# Patient Record
Sex: MALE | Race: WHITE | Employment: FULL TIME | URBAN - METROPOLITAN AREA
[De-identification: names, ages, dates, MRNs, and addresses within clinical notes are randomized per-mention and may not be internally consistent; named-entity substitution may affect disease eponyms.]

---

## 2020-01-27 ENCOUNTER — APPOINTMENT (OUTPATIENT)
Dept: CT IMAGING | Age: 35
End: 2020-01-27
Attending: EMERGENCY MEDICINE
Payer: COMMERCIAL

## 2020-01-27 ENCOUNTER — APPOINTMENT (OUTPATIENT)
Dept: ULTRASOUND IMAGING | Age: 35
End: 2020-01-27
Attending: EMERGENCY MEDICINE
Payer: COMMERCIAL

## 2020-01-27 ENCOUNTER — HOSPITAL ENCOUNTER (EMERGENCY)
Age: 35
Discharge: HOME | End: 2020-01-27
Attending: EMERGENCY MEDICINE
Payer: COMMERCIAL

## 2020-01-27 VITALS
DIASTOLIC BLOOD PRESSURE: 81 MMHG | TEMPERATURE: 98 F | RESPIRATION RATE: 16 BRPM | OXYGEN SATURATION: 99 % | HEART RATE: 85 BPM | SYSTOLIC BLOOD PRESSURE: 125 MMHG

## 2020-01-27 DIAGNOSIS — R11.2 NAUSEA AND VOMITING IN ADULT: ICD-10-CM

## 2020-01-27 DIAGNOSIS — R10.11 RUQ ABDOMINAL PAIN: ICD-10-CM

## 2020-01-27 DIAGNOSIS — K76.0 HEPATIC STEATOSIS: Primary | ICD-10-CM

## 2020-01-27 LAB
ALBUMIN SERPL-MCNC: 4.1 G/DL (ref 3.5–5)
ALBUMIN/GLOB SERPL: 1.1 {RATIO} (ref 1.1–2.2)
ALP SERPL-CCNC: 130 U/L (ref 45–117)
ALT SERPL-CCNC: 110 U/L (ref 12–78)
ANION GAP SERPL CALC-SCNC: 13 MMOL/L (ref 5–15)
APPEARANCE UR: CLEAR
AST SERPL-CCNC: 49 U/L (ref 15–37)
BASOPHILS # BLD: 0 K/UL (ref 0–0.1)
BASOPHILS NFR BLD: 1 % (ref 0–1)
BILIRUB SERPL-MCNC: 0.5 MG/DL (ref 0.2–1)
BILIRUB UR QL: NEGATIVE
BUN SERPL-MCNC: 11 MG/DL (ref 6–20)
BUN/CREAT SERPL: 14 (ref 12–20)
CALCIUM SERPL-MCNC: 9.3 MG/DL (ref 8.5–10.1)
CHLORIDE SERPL-SCNC: 103 MMOL/L (ref 97–108)
CO2 SERPL-SCNC: 24 MMOL/L (ref 21–32)
COLOR UR: ABNORMAL
COMMENT, HOLDF: NORMAL
CREAT SERPL-MCNC: 0.81 MG/DL (ref 0.7–1.3)
DIFFERENTIAL METHOD BLD: NORMAL
EOSINOPHIL # BLD: 0.4 K/UL (ref 0–0.4)
EOSINOPHIL NFR BLD: 5 % (ref 0–7)
ERYTHROCYTE [DISTWIDTH] IN BLOOD BY AUTOMATED COUNT: 12 % (ref 11.5–14.5)
GLOBULIN SER CALC-MCNC: 3.7 G/DL (ref 2–4)
GLUCOSE SERPL-MCNC: 132 MG/DL (ref 65–100)
GLUCOSE UR STRIP.AUTO-MCNC: 100 MG/DL
HCT VFR BLD AUTO: 40.8 % (ref 36.6–50.3)
HGB BLD-MCNC: 13.7 G/DL (ref 12.1–17)
HGB UR QL STRIP: NEGATIVE
IMM GRANULOCYTES # BLD AUTO: 0 K/UL (ref 0–0.04)
IMM GRANULOCYTES NFR BLD AUTO: 0 % (ref 0–0.5)
KETONES UR QL STRIP.AUTO: NEGATIVE MG/DL
LEUKOCYTE ESTERASE UR QL STRIP.AUTO: NEGATIVE
LIPASE SERPL-CCNC: 218 U/L (ref 73–393)
LYMPHOCYTES # BLD: 2.6 K/UL (ref 0.8–3.5)
LYMPHOCYTES NFR BLD: 32 % (ref 12–49)
MCH RBC QN AUTO: 28.8 PG (ref 26–34)
MCHC RBC AUTO-ENTMCNC: 33.6 G/DL (ref 30–36.5)
MCV RBC AUTO: 85.9 FL (ref 80–99)
MONOCYTES # BLD: 0.5 K/UL (ref 0–1)
MONOCYTES NFR BLD: 7 % (ref 5–13)
NEUTS SEG # BLD: 4.5 K/UL (ref 1.8–8)
NEUTS SEG NFR BLD: 55 % (ref 32–75)
NITRITE UR QL STRIP.AUTO: NEGATIVE
NRBC # BLD: 0 K/UL (ref 0–0.01)
NRBC BLD-RTO: 0 PER 100 WBC
PH UR STRIP: 5 [PH] (ref 5–8)
PLATELET # BLD AUTO: 240 K/UL (ref 150–400)
PMV BLD AUTO: 10.6 FL (ref 8.9–12.9)
POTASSIUM SERPL-SCNC: 3.7 MMOL/L (ref 3.5–5.1)
PROT SERPL-MCNC: 7.8 G/DL (ref 6.4–8.2)
PROT UR STRIP-MCNC: NEGATIVE MG/DL
RBC # BLD AUTO: 4.75 M/UL (ref 4.1–5.7)
SAMPLES BEING HELD,HOLD: NORMAL
SODIUM SERPL-SCNC: 140 MMOL/L (ref 136–145)
SP GR UR REFRACTOMETRY: 1.02 (ref 1–1.03)
UROBILINOGEN UR QL STRIP.AUTO: 0.2 EU/DL (ref 0.2–1)
WBC # BLD AUTO: 8 K/UL (ref 4.1–11.1)

## 2020-01-27 PROCEDURE — 74177 CT ABD & PELVIS W/CONTRAST: CPT

## 2020-01-27 PROCEDURE — 36415 COLL VENOUS BLD VENIPUNCTURE: CPT

## 2020-01-27 PROCEDURE — 76705 ECHO EXAM OF ABDOMEN: CPT

## 2020-01-27 PROCEDURE — 99283 EMERGENCY DEPT VISIT LOW MDM: CPT

## 2020-01-27 PROCEDURE — 74011636320 HC RX REV CODE- 636/320: Performed by: EMERGENCY MEDICINE

## 2020-01-27 PROCEDURE — 81003 URINALYSIS AUTO W/O SCOPE: CPT

## 2020-01-27 PROCEDURE — 80053 COMPREHEN METABOLIC PANEL: CPT

## 2020-01-27 PROCEDURE — 96374 THER/PROPH/DIAG INJ IV PUSH: CPT

## 2020-01-27 PROCEDURE — 85025 COMPLETE CBC W/AUTO DIFF WBC: CPT

## 2020-01-27 PROCEDURE — 83690 ASSAY OF LIPASE: CPT

## 2020-01-27 PROCEDURE — 74011250636 HC RX REV CODE- 250/636: Performed by: EMERGENCY MEDICINE

## 2020-01-27 PROCEDURE — 96375 TX/PRO/DX INJ NEW DRUG ADDON: CPT

## 2020-01-27 RX ORDER — METFORMIN HYDROCHLORIDE 1000 MG/1
1000 TABLET ORAL 2 TIMES DAILY
COMMUNITY

## 2020-01-27 RX ORDER — GLIPIZIDE 10 MG/1
10 TABLET ORAL DAILY
COMMUNITY

## 2020-01-27 RX ORDER — SODIUM CHLORIDE 9 MG/ML
50 INJECTION, SOLUTION INTRAVENOUS ONCE
Status: COMPLETED | OUTPATIENT
Start: 2020-01-27 | End: 2020-01-27

## 2020-01-27 RX ORDER — ONDANSETRON 2 MG/ML
4 INJECTION INTRAMUSCULAR; INTRAVENOUS
Status: COMPLETED | OUTPATIENT
Start: 2020-01-27 | End: 2020-01-27

## 2020-01-27 RX ORDER — SODIUM CHLORIDE 0.9 % (FLUSH) 0.9 %
10 SYRINGE (ML) INJECTION ONCE
Status: COMPLETED | OUTPATIENT
Start: 2020-01-27 | End: 2020-01-27

## 2020-01-27 RX ORDER — FLUOXETINE HYDROCHLORIDE 40 MG/1
40 CAPSULE ORAL DAILY
COMMUNITY

## 2020-01-27 RX ORDER — ONDANSETRON 4 MG/1
4 TABLET, ORALLY DISINTEGRATING ORAL
Qty: 15 TAB | Refills: 0 | Status: SHIPPED | OUTPATIENT
Start: 2020-01-27

## 2020-01-27 RX ORDER — LISINOPRIL 5 MG/1
5 TABLET ORAL DAILY
COMMUNITY

## 2020-01-27 RX ORDER — KETOROLAC TROMETHAMINE 30 MG/ML
30 INJECTION, SOLUTION INTRAMUSCULAR; INTRAVENOUS ONCE
Status: COMPLETED | OUTPATIENT
Start: 2020-01-27 | End: 2020-01-27

## 2020-01-27 RX ORDER — DICYCLOMINE HYDROCHLORIDE 10 MG/1
10 CAPSULE ORAL
Qty: 20 CAP | Refills: 0 | Status: SHIPPED | OUTPATIENT
Start: 2020-01-27 | End: 2020-02-01

## 2020-01-27 RX ORDER — ATORVASTATIN CALCIUM 40 MG/1
40 TABLET, FILM COATED ORAL DAILY
COMMUNITY

## 2020-01-27 RX ORDER — ONDANSETRON 4 MG/1
4 TABLET, ORALLY DISINTEGRATING ORAL
Qty: 20 TAB | Refills: 0 | Status: SHIPPED | OUTPATIENT
Start: 2020-01-27

## 2020-01-27 RX ADMIN — Medication 10 ML: at 19:00

## 2020-01-27 RX ADMIN — SODIUM CHLORIDE 50 ML/HR: 900 INJECTION, SOLUTION INTRAVENOUS at 19:00

## 2020-01-27 RX ADMIN — ONDANSETRON 4 MG: 2 INJECTION INTRAMUSCULAR; INTRAVENOUS at 17:06

## 2020-01-27 RX ADMIN — IOPAMIDOL 100 ML: 755 INJECTION, SOLUTION INTRAVENOUS at 19:00

## 2020-01-27 RX ADMIN — KETOROLAC TROMETHAMINE 30 MG: 30 INJECTION, SOLUTION INTRAMUSCULAR at 17:06

## 2020-01-27 NOTE — ED PROVIDER NOTES
74-year-old male with a history of basal carcinoma, type 2 diabetes, obesity presents to the emergency department his PCP's office for evaluation of 1 month history of waxing waning right upper quadrant abdominal pain with associated nausea and vomiting that is significantly worse after eating particularly fat containing meals. He denies any fever, chills, diarrhea, dysuria, hematuria. No history of prior abdominal surgeries. He has not been taking anything for his symptoms. He states that his symptoms seem to wax and wane lasting for about 2 hours after eating and then spontaneously resolving. He was referred to the emergency department by his PCP for evaluation for possible biliary colic. On arrival he states that his pain is 4-5/10 but when his symptoms get severe they are 9/10. Past Medical History:   Diagnosis Date    Basal cell carcinoma     [facility] ([facility]     Type two       Past Surgical History:   Procedure Laterality Date    HX VASECTOMY           History reviewed. No pertinent family history.     Social History     Socioeconomic History    Marital status:      Spouse name: Not on file    Number of children: Not on file    Years of education: Not on file    Highest education level: Not on file   Occupational History    Not on file   Social Needs    Financial resource strain: Not on file    Food insecurity:     Worry: Not on file     Inability: Not on file    Transportation needs:     Medical: Not on file     Non-medical: Not on file   Tobacco Use    Smoking status: Never Smoker    Smokeless tobacco: Never Used   Substance and Sexual Activity    Alcohol use: Yes     Comment: socially    Drug use: Never    Sexual activity: Not on file   Lifestyle    Physical activity:     Days per week: Not on file     Minutes per session: Not on file    Stress: Not on file   Relationships    Social connections:     Talks on phone: Not on file     Gets together: Not on file     Attends Rastafarian service: Not on file     Active member of club or organization: Not on file     Attends meetings of clubs or organizations: Not on file     Relationship status: Not on file    Intimate partner violence:     Fear of current or ex partner: Not on file     Emotionally abused: Not on file     Physically abused: Not on file     Forced sexual activity: Not on file   Other Topics Concern    Not on file   Social History Narrative    Not on file         ALLERGIES: Scallops and Ceclor [cefaclor]    Review of Systems   Constitutional: Positive for activity change and appetite change. Negative for chills and fever. HENT: Negative for congestion, rhinorrhea, sinus pain, sneezing and sore throat. Eyes: Negative for photophobia and visual disturbance. Respiratory: Negative for cough and shortness of breath. Cardiovascular: Negative for chest pain. Gastrointestinal: Positive for abdominal pain, nausea and vomiting. Negative for blood in stool, constipation and diarrhea. Genitourinary: Negative for difficulty urinating, dysuria, flank pain, hematuria, penile pain and testicular pain. Musculoskeletal: Negative for arthralgias, back pain, myalgias and neck pain. Skin: Negative for rash and wound. Neurological: Negative for syncope, weakness, light-headedness, numbness and headaches. Psychiatric/Behavioral: Negative for self-injury and suicidal ideas. All other systems reviewed and are negative. Vitals:    01/27/20 1645   BP: 157/82   Pulse: 87   Resp: 20   Temp: 98.3 °F (36.8 °C)   SpO2: 96%            Physical Exam  Vitals signs and nursing note reviewed. Constitutional:       General: He is not in acute distress. Appearance: Normal appearance. He is well-developed. He is obese. He is not diaphoretic. HENT:      Head: Normocephalic and atraumatic. Nose: Nose normal.   Eyes:      Extraocular Movements: Extraocular movements intact.       Conjunctiva/sclera: Conjunctivae normal. Pupils: Pupils are equal, round, and reactive to light. Neck:      Musculoskeletal: Neck supple. Cardiovascular:      Rate and Rhythm: Normal rate and regular rhythm. Heart sounds: Normal heart sounds. Pulmonary:      Effort: Pulmonary effort is normal.      Breath sounds: Normal breath sounds. Abdominal:      General: There is no distension. Palpations: Abdomen is soft. Tenderness: There is abdominal tenderness in the right upper quadrant. There is no right CVA tenderness, left CVA tenderness, guarding or rebound. Negative signs include Jean Baptiste's sign, Rovsing's sign and McBurney's sign. Musculoskeletal:         General: No tenderness. Skin:     General: Skin is warm and dry. Neurological:      General: No focal deficit present. Mental Status: He is alert and oriented to person, place, and time. Cranial Nerves: No cranial nerve deficit. Sensory: No sensory deficit. Motor: No weakness. Coordination: Coordination normal.          MDM   26-year-old gentleman presents with intermittent right upper quadrant abdominal pain, nausea, vomiting concerning for biliary colic versus cholecystitis versus colitis versus obstruction. Patient is afebrile vital signs stable in no acute distress. Tender right upper quadrant without evidence of peritonitis. He was given a dose of Zofran and Toradol and had significant improvement in his symptoms. UA negative. Labs returned showing no leukocytosis, mildly elevated LFTs with , AST 49, alk phos of 130, otherwise unremarkable. Normal lipase. Right upper quadrant ultrasound shows severe hepatic steatosis but unremarkable gallbladder. CT abdomen pelvis was then done to further evaluate and returned showing no other acute intra-abdominal abnormalities. Was recommended weight loss and was Rx'd Zofran and Bentyl for further symptomatic relief.   Recommended follow-up with gastroenterology for further evaluation of his symptoms. Recommended to avoid alcohol and Tylenol in the future and return precautions were given for worsening or concerns.     Procedures

## 2020-01-27 NOTE — ED TRIAGE NOTES
Pt arrives ambulatory to ed from PCP with complaints of nausea x 1 month. Pt states RUQ pain. PCP concerned symptoms are related to gallbladder.

## 2020-01-28 NOTE — ED NOTES
Bedside and Verbal shift change report given to Feliberto Leonard (oncoming nurse) by Jenna Lux RN (offgoing nurse). Report included the following information SBAR and ED Summary.

## 2020-03-10 ENCOUNTER — HOSPITAL ENCOUNTER (OUTPATIENT)
Dept: NUCLEAR MEDICINE | Age: 35
Discharge: HOME | End: 2020-03-10
Attending: INTERNAL MEDICINE
Payer: COMMERCIAL

## 2020-03-10 DIAGNOSIS — R10.13 EPIGASTRIC PAIN: ICD-10-CM

## 2020-03-10 DIAGNOSIS — E66.9 OBESITY, UNSPECIFIED: ICD-10-CM

## 2020-03-10 DIAGNOSIS — E11.9 TYPE II DIABETES MELLITUS (HCC): ICD-10-CM

## 2020-03-10 DIAGNOSIS — R10.11 RIGHT UPPER QUADRANT PAIN: ICD-10-CM

## 2020-03-10 DIAGNOSIS — R11.2 NAUSEA WITH VOMITING: ICD-10-CM

## 2020-03-10 PROCEDURE — 78264 GASTRIC EMPTYING IMG STUDY: CPT

## 2022-04-08 ENCOUNTER — APPOINTMENT (OUTPATIENT)
Dept: CT IMAGING | Age: 37
End: 2022-04-08
Payer: COMMERCIAL

## 2022-04-08 ENCOUNTER — HOSPITAL ENCOUNTER (EMERGENCY)
Age: 37
Discharge: HOME | End: 2022-04-08
Attending: EMERGENCY MEDICINE
Payer: COMMERCIAL

## 2022-04-08 VITALS
OXYGEN SATURATION: 97 % | DIASTOLIC BLOOD PRESSURE: 70 MMHG | RESPIRATION RATE: 18 BRPM | HEART RATE: 114 BPM | SYSTOLIC BLOOD PRESSURE: 126 MMHG | TEMPERATURE: 97.7 F

## 2022-04-08 DIAGNOSIS — R10.9 RIGHT-SIDED ABDOMINAL PAIN OF UNKNOWN CAUSE: Primary | ICD-10-CM

## 2022-04-08 DIAGNOSIS — D72.829 LEUKOCYTOSIS, UNSPECIFIED TYPE: ICD-10-CM

## 2022-04-08 DIAGNOSIS — R91.1 LUNG NODULE: ICD-10-CM

## 2022-04-08 DIAGNOSIS — R73.9 HYPERGLYCEMIA: ICD-10-CM

## 2022-04-08 DIAGNOSIS — R11.2 NAUSEA AND VOMITING, UNSPECIFIED VOMITING TYPE: ICD-10-CM

## 2022-04-08 LAB
ALBUMIN SERPL-MCNC: 4.7 G/DL (ref 3.5–5)
ALBUMIN/GLOB SERPL: 1.1 {RATIO} (ref 1.1–2.2)
ALP SERPL-CCNC: 157 U/L (ref 45–117)
ALT SERPL-CCNC: 48 U/L (ref 12–78)
ANION GAP SERPL CALC-SCNC: 6 MMOL/L (ref 5–15)
APPEARANCE UR: CLEAR
AST SERPL-CCNC: 23 U/L (ref 15–37)
BACTERIA URNS QL MICRO: NEGATIVE /HPF
BASOPHILS # BLD: 0 K/UL (ref 0–0.1)
BASOPHILS NFR BLD: 0 % (ref 0–1)
BILIRUB SERPL-MCNC: 0.6 MG/DL (ref 0.2–1)
BILIRUB UR QL: NEGATIVE
BUN SERPL-MCNC: 20 MG/DL (ref 6–20)
BUN/CREAT SERPL: 17 (ref 12–20)
CALCIUM SERPL-MCNC: 10 MG/DL (ref 8.5–10.1)
CHLORIDE SERPL-SCNC: 107 MMOL/L (ref 97–108)
CO2 SERPL-SCNC: 23 MMOL/L (ref 21–32)
COLOR UR: ABNORMAL
COMMENT, HOLDF: NORMAL
CREAT SERPL-MCNC: 1.15 MG/DL (ref 0.7–1.3)
DIFFERENTIAL METHOD BLD: ABNORMAL
EOSINOPHIL # BLD: 0 K/UL (ref 0–0.4)
EOSINOPHIL NFR BLD: 0 % (ref 0–7)
EPITH CASTS URNS QL MICRO: ABNORMAL /LPF
ERYTHROCYTE [DISTWIDTH] IN BLOOD BY AUTOMATED COUNT: 11.9 % (ref 11.5–14.5)
GLOBULIN SER CALC-MCNC: 4.1 G/DL (ref 2–4)
GLUCOSE BLD STRIP.AUTO-MCNC: 244 MG/DL (ref 65–117)
GLUCOSE SERPL-MCNC: 237 MG/DL (ref 65–100)
GLUCOSE UR STRIP.AUTO-MCNC: >1000 MG/DL
HCT VFR BLD AUTO: 52 % (ref 36.6–50.3)
HGB BLD-MCNC: 17.2 G/DL (ref 12.1–17)
HGB UR QL STRIP: NEGATIVE
HYALINE CASTS URNS QL MICRO: ABNORMAL /LPF (ref 0–5)
IMM GRANULOCYTES # BLD AUTO: 0 K/UL (ref 0–0.04)
IMM GRANULOCYTES NFR BLD AUTO: 0 % (ref 0–0.5)
KETONES UR QL STRIP.AUTO: ABNORMAL MG/DL
LEUKOCYTE ESTERASE UR QL STRIP.AUTO: NEGATIVE
LIPASE SERPL-CCNC: 133 U/L (ref 73–393)
LYMPHOCYTES # BLD: 1 K/UL (ref 0.8–3.5)
LYMPHOCYTES NFR BLD: 5 % (ref 12–49)
MCH RBC QN AUTO: 29.7 PG (ref 26–34)
MCHC RBC AUTO-ENTMCNC: 33.1 G/DL (ref 30–36.5)
MCV RBC AUTO: 89.8 FL (ref 80–99)
MONOCYTES # BLD: 0.8 K/UL (ref 0–1)
MONOCYTES NFR BLD: 4 % (ref 5–13)
NEUTS SEG # BLD: 17.3 K/UL (ref 1.8–8)
NEUTS SEG NFR BLD: 91 % (ref 32–75)
NITRITE UR QL STRIP.AUTO: NEGATIVE
NRBC # BLD: 0 K/UL (ref 0–0.01)
NRBC BLD-RTO: 0 PER 100 WBC
PH UR STRIP: 5 [PH] (ref 5–8)
PLATELET # BLD AUTO: 252 K/UL (ref 150–400)
PMV BLD AUTO: 11 FL (ref 8.9–12.9)
POTASSIUM SERPL-SCNC: 4.9 MMOL/L (ref 3.5–5.1)
PROT SERPL-MCNC: 8.8 G/DL (ref 6.4–8.2)
PROT UR STRIP-MCNC: NEGATIVE MG/DL
RBC # BLD AUTO: 5.79 M/UL (ref 4.1–5.7)
RBC #/AREA URNS HPF: ABNORMAL /HPF (ref 0–5)
RBC MORPH BLD: ABNORMAL
SAMPLES BEING HELD,HOLD: NORMAL
SERVICE CMNT-IMP: ABNORMAL
SODIUM SERPL-SCNC: 136 MMOL/L (ref 136–145)
SP GR UR REFRACTOMETRY: 1.01 (ref 1–1.03)
UR CULT HOLD, URHOLD: NORMAL
UROBILINOGEN UR QL STRIP.AUTO: 0.2 EU/DL (ref 0.2–1)
WBC # BLD AUTO: 19.1 K/UL (ref 4.1–11.1)
WBC URNS QL MICRO: ABNORMAL /HPF (ref 0–4)

## 2022-04-08 PROCEDURE — 74011250636 HC RX REV CODE- 250/636: Performed by: NURSE PRACTITIONER

## 2022-04-08 PROCEDURE — 82962 GLUCOSE BLOOD TEST: CPT

## 2022-04-08 PROCEDURE — 96374 THER/PROPH/DIAG INJ IV PUSH: CPT

## 2022-04-08 PROCEDURE — 74011000636 HC RX REV CODE- 636: Performed by: RADIOLOGY

## 2022-04-08 PROCEDURE — 74011250636 HC RX REV CODE- 250/636: Performed by: PHYSICIAN ASSISTANT

## 2022-04-08 PROCEDURE — 83690 ASSAY OF LIPASE: CPT

## 2022-04-08 PROCEDURE — 74011000250 HC RX REV CODE- 250: Performed by: NURSE PRACTITIONER

## 2022-04-08 PROCEDURE — 85025 COMPLETE CBC W/AUTO DIFF WBC: CPT

## 2022-04-08 PROCEDURE — 74177 CT ABD & PELVIS W/CONTRAST: CPT

## 2022-04-08 PROCEDURE — 74011250636 HC RX REV CODE- 250/636: Performed by: EMERGENCY MEDICINE

## 2022-04-08 PROCEDURE — 80053 COMPREHEN METABOLIC PANEL: CPT

## 2022-04-08 PROCEDURE — 81001 URINALYSIS AUTO W/SCOPE: CPT

## 2022-04-08 PROCEDURE — 99285 EMERGENCY DEPT VISIT HI MDM: CPT

## 2022-04-08 PROCEDURE — 96375 TX/PRO/DX INJ NEW DRUG ADDON: CPT

## 2022-04-08 RX ORDER — PROMETHAZINE HYDROCHLORIDE 25 MG/1
25 SUPPOSITORY RECTAL
Qty: 12 SUPPOSITORY | Refills: 0 | Status: SHIPPED | OUTPATIENT
Start: 2022-04-08 | End: 2022-04-15

## 2022-04-08 RX ORDER — ONDANSETRON 4 MG/1
4 TABLET, FILM COATED ORAL
Qty: 15 TABLET | Refills: 0 | Status: SHIPPED | OUTPATIENT
Start: 2022-04-08

## 2022-04-08 RX ORDER — ONDANSETRON 2 MG/ML
4 INJECTION INTRAMUSCULAR; INTRAVENOUS
Status: COMPLETED | OUTPATIENT
Start: 2022-04-08 | End: 2022-04-08

## 2022-04-08 RX ORDER — ONDANSETRON 2 MG/ML
INJECTION INTRAMUSCULAR; INTRAVENOUS
Status: DISCONTINUED
Start: 2022-04-08 | End: 2022-04-08 | Stop reason: HOSPADM

## 2022-04-08 RX ORDER — DIPHENHYDRAMINE HYDROCHLORIDE 50 MG/ML
25 INJECTION, SOLUTION INTRAMUSCULAR; INTRAVENOUS
Status: COMPLETED | OUTPATIENT
Start: 2022-04-08 | End: 2022-04-08

## 2022-04-08 RX ADMIN — ONDANSETRON HYDROCHLORIDE 4 MG: 2 SOLUTION INTRAMUSCULAR; INTRAVENOUS at 18:45

## 2022-04-08 RX ADMIN — SODIUM CHLORIDE 1000 ML: 9 INJECTION, SOLUTION INTRAVENOUS at 16:26

## 2022-04-08 RX ADMIN — SODIUM CHLORIDE, PRESERVATIVE FREE 20 MG: 5 INJECTION INTRAVENOUS at 19:39

## 2022-04-08 RX ADMIN — DIPHENHYDRAMINE HYDROCHLORIDE 25 MG: 50 INJECTION INTRAMUSCULAR; INTRAVENOUS at 19:38

## 2022-04-08 RX ADMIN — SODIUM CHLORIDE, PRESERVATIVE FREE 5 MG: 5 INJECTION INTRAVENOUS at 19:38

## 2022-04-08 RX ADMIN — IOPAMIDOL 100 ML: 755 INJECTION, SOLUTION INTRAVENOUS at 17:08

## 2022-04-08 NOTE — ED PROVIDER NOTES
Date of Service:  4/8/2022    Patient:  Danilo Solomon    Chief Complaint:  Abdominal Pain and Vomiting       HPI:  Danilo Solomon is a 39 y.o.  male who presents for evaluation of side abdominal pain that started this morning around 10 AM.  Patient states nausea and vomiting. She states that he was seen at an urgent care about 1 hour prior to arrival to the emergency department. Patient states lab work showed an elevated WBC. Patient was instructed to follow-up at the emergency department for further evaluation. She denies fever chills. Patient denies chest pain or shortness of breath. Denies history of cholecystectomy or appendectomy. Past Medical History:   Diagnosis Date    Basal cell carcinoma     Diabetes ([facility]     Type two       Past Surgical History:   Procedure Laterality Date    HX VASECTOMY           No family history on file. Social History     Socioeconomic History    Marital status:      Spouse name: Not on file    Number of children: Not on file    Years of education: Not on file    Highest education level: Not on file   Occupational History    Not on file   Tobacco Use    Smoking status: Never Smoker    Smokeless tobacco: Never Used   Substance and Sexual Activity    Alcohol use: Yes     Comment: socially    Drug use: Never    Sexual activity: Not on file   Other Topics Concern    Not on file   Social History Narrative    Not on file     Social Determinants of Health     Financial Resource Strain:     Difficulty of Paying Living Expenses: Not on file   Food Insecurity:     Worried About Running Out of Food in the Last Year: Not on file    Fausto of Food in the Last Year: Not on file   Transportation Needs:     Lack of Transportation (Medical): Not on file    Lack of Transportation (Non-Medical):  Not on file   Physical Activity:     Days of Exercise per Week: Not on file    Minutes of Exercise per Session: Not on file   Stress:     Feeling of Stress : Not on file   Social Connections:     Frequency of Communication with Friends and Family: Not on file    Frequency of Social Gatherings with Friends and Family: Not on file    Attends Congregational Services: Not on file    Active Member of Clubs or Organizations: Not on file    Attends Club or Organization Meetings: Not on file    Marital Status: Not on file   Intimate Partner Violence:     Fear of Current or Ex-Partner: Not on file    Emotionally Abused: Not on file    Physically Abused: Not on file    Sexually Abused: Not on file   Housing Stability:     Unable to Pay for Housing in the Last Year: Not on file    Number of [place] in the Last Year: Not on file    Unstable Housing in the Last Year: Not on file         ALLERGIES: Scallops and Ceclor [cefaclor]    Review of Systems   Constitutional: Negative for chills and fever. Respiratory: Negative for shortness of breath. Cardiovascular: Negative for chest pain. Gastrointestinal: Positive for abdominal pain, nausea and vomiting. Negative for blood in stool and diarrhea. Genitourinary: Negative for dysuria. Skin: Negative for rash. Allergic/Immunologic: Negative for immunocompromised state. Neurological: Negative for headaches. Psychiatric/Behavioral: Negative for agitation. All other systems reviewed and are negative. Vitals:    04/08/22 1525   BP: 127/82   Pulse: (!) 104   Resp: 18   Temp: 97.6 °F (36.4 °C)   SpO2: 99%            Physical Exam  Vitals and nursing note reviewed. Constitutional:       General: He is not in acute distress. HENT:      Right Ear: External ear normal.      Left Ear: External ear normal.   Eyes:      Conjunctiva/sclera: Conjunctivae normal.   Cardiovascular:      Rate and Rhythm: Normal rate and regular rhythm. Heart sounds: No murmur heard. Pulmonary:      Effort: Pulmonary effort is normal.      Breath sounds: Normal breath sounds.    Abdominal:      Palpations: Abdomen is soft.      Tenderness: There is abdominal tenderness in the right upper quadrant and right lower quadrant. There is no right CVA tenderness or left CVA tenderness. Musculoskeletal:         General: Normal range of motion. Cervical back: Normal range of motion and neck supple. Skin:     General: Skin is warm. Neurological:      Mental Status: He is alert and oriented to person, place, and time. Mental status is at baseline. MDM       Procedures    VITAL SIGNS:  Patient Vitals for the past 4 hrs:   Temp Pulse Resp BP SpO2   04/08/22 1525 97.6 °F (36.4 °C) (!) 104 18 127/82 99 %         LABS:  Recent Results (from the past 6 hour(s))   SAMPLES BEING HELD    Collection Time: 04/08/22  4:07 PM   Result Value Ref Range    SAMPLES BEING HELD 1RED,1LAV,1PST,1BLU     COMMENT        Add-on orders for these samples will be processed based on acceptable specimen integrity and analyte stability, which may vary by analyte. CBC WITH AUTOMATED DIFF    Collection Time: 04/08/22  4:07 PM   Result Value Ref Range    WBC 19.1 (H) 4.1 - 11.1 K/uL    RBC 5.79 (H) 4.10 - 5.70 M/uL    HGB 17.2 (H) 12.1 - 17.0 g/dL    HCT 52.0 (H) 36.6 - 50.3 %    MCV 89.8 80.0 - 99.0 FL    MCH 29.7 26.0 - 34.0 PG    MCHC 33.1 30.0 - 36.5 g/dL    RDW 11.9 11.5 - 14.5 %    PLATELET 210 296 - 039 K/uL    MPV 11.0 8.9 - 12.9 FL    NRBC 0.0 0  WBC    ABSOLUTE NRBC 0.00 0.00 - 0.01 K/uL    NEUTROPHILS 91 (H) 32 - 75 %    LYMPHOCYTES 5 (L) 12 - 49 %    MONOCYTES 4 (L) 5 - 13 %    EOSINOPHILS 0 0 - 7 %    BASOPHILS 0 0 - 1 %    IMMATURE GRANULOCYTES 0 0.0 - 0.5 %    ABS. NEUTROPHILS 17.3 (H) 1.8 - 8.0 K/UL    ABS. LYMPHOCYTES 1.0 0.8 - 3.5 K/UL    ABS. MONOCYTES 0.8 0.0 - 1.0 K/UL    ABS. EOSINOPHILS 0.0 0.0 - 0.4 K/UL    ABS. BASOPHILS 0.0 0.0 - 0.1 K/UL    ABS. IMM.  GRANS. 0.0 0.00 - 0.04 K/UL    DF SMEAR SCANNED      RBC COMMENTS NORMOCYTIC, NORMOCHROMIC     METABOLIC PANEL, COMPREHENSIVE    Collection Time: 04/08/22  4:07 PM Result Value Ref Range    Sodium 136 136 - 145 mmol/L    Potassium 4.9 3.5 - 5.1 mmol/L    Chloride 107 97 - 108 mmol/L    CO2 23 21 - 32 mmol/L    Anion gap 6 5 - 15 mmol/L    Glucose 237 (H) 65 - 100 mg/dL    BUN 20 6 - 20 MG/DL    Creatinine 1.15 0.70 - 1.30 MG/DL    BUN/Creatinine ratio 17 12 - 20      GFR est AA >60 >60 ml/min/1.73m2    GFR est non-AA >60 >60 ml/min/1.73m2    Calcium 10.0 8.5 - 10.1 MG/DL    Bilirubin, total 0.6 0.2 - 1.0 MG/DL    ALT (SGPT) 48 12 - 78 U/L    AST (SGOT) 23 15 - 37 U/L    Alk. phosphatase 157 (H) 45 - 117 U/L    Protein, total 8.8 (H) 6.4 - 8.2 g/dL    Albumin 4.7 3.5 - 5.0 g/dL    Globulin 4.1 (H) 2.0 - 4.0 g/dL    A-G Ratio 1.1 1.1 - 2.2     LIPASE    Collection Time: 04/08/22  4:07 PM   Result Value Ref Range    Lipase 133 73 - 393 U/L        IMAGING:  CT ABD PELV W CONT   Final Result   1. No acute abnormality in the abdomen or pelvis. 2. Stable right infrahilar and subcentimeter right lower lobe lung nodules. Medications During Visit:  Medications   sodium chloride 0.9 % bolus infusion 1,000 mL (1,000 mL IntraVENous New Bag 4/8/22 3046)   iopamidoL (ISOVUE-370) 76 % injection 100 mL (100 mL IntraVENous Given 4/8/22 2937)         DECISION MAKING:  Berry Stoll is a 39 y.o. male who comes in as above. End of shift. Patient handoff to [facility] Part NP      IMPRESSION:  1. Right-sided abdominal pain of unknown cause    2.  Leukocytosis, unspecified type        DISPOSITION:        Current Discharge Medication List           Follow-up Information     Follow up With Specialties Details Why Contact Any [facility] Physician Assistant Schedule an appointment as soon as possible for a visit   [address] [facility]      [address] DEP Emergency Medicine  If symptoms worsen [address]  [phone]

## 2022-04-08 NOTE — ED TRIAGE NOTES
TRIAGE NOTE:  Patient arrives ambulatory from urgent care with c/o sudden onset of nausea and vomiting this morning around 10 am.  Patient reports pain to right side of abdomen.

## 2022-04-08 NOTE — ED NOTES
5:55 PM  Change of shift. Care of patient taken over from [facility]; H&P reviewed, bedside handoff complete. Awaiting UA results. No evidence of UTI on urinalysis. Patient had episode of recurrent vomiting after antiemetic in the ER. He was provided with additional medication with resolution of symptoms. Patient remained hyperglycemic. He was advised to follow-up with his primary care team for management of his diabetes, next week. We have discussed dietary modifications to help manage his symptoms. His abdomen is soft and nontender on exam.  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 Reasons to return to ED provided. LABORATORY TESTS:  Recent Results (from the past 12 hour(s))   SAMPLES BEING HELD    Collection Time: 04/08/22  4:07 PM   Result Value Ref Range    SAMPLES BEING HELD 1RED,1LAV,1PST,1BLU     COMMENT        Add-on orders for these samples will be processed based on acceptable specimen integrity and analyte stability, which may vary by analyte. CBC WITH AUTOMATED DIFF    Collection Time: 04/08/22  4:07 PM   Result Value Ref Range    WBC 19.1 (H) 4.1 - 11.1 K/uL    RBC 5.79 (H) 4.10 - 5.70 M/uL    HGB 17.2 (H) 12.1 - 17.0 g/dL    HCT 52.0 (H) 36.6 - 50.3 %    MCV 89.8 80.0 - 99.0 FL    MCH 29.7 26.0 - 34.0 PG    MCHC 33.1 30.0 - 36.5 g/dL    RDW 11.9 11.5 - 14.5 %    PLATELET 029 190 - 846 K/uL    MPV 11.0 8.9 - 12.9 FL    NRBC 0.0 0  WBC    ABSOLUTE NRBC 0.00 0.00 - 0.01 K/uL    NEUTROPHILS 91 (H) 32 - 75 %    LYMPHOCYTES 5 (L) 12 - 49 %    MONOCYTES 4 (L) 5 - 13 %    EOSINOPHILS 0 0 - 7 %    BASOPHILS 0 0 - 1 %    IMMATURE GRANULOCYTES 0 0.0 - 0.5 %    ABS. NEUTROPHILS 17.3 (H) 1.8 - 8.0 K/UL    ABS. LYMPHOCYTES 1.0 0.8 - 3.5 K/UL    ABS. MONOCYTES 0.8 0.0 - 1.0 K/UL    ABS. EOSINOPHILS 0.0 0.0 - 0.4 K/UL    ABS. BASOPHILS 0.0 0.0 - 0.1 K/UL    ABS. IMM.  GRANS. 0.0 0.00 - 0.04 K/UL    DF SMEAR SCANNED      RBC COMMENTS NORMOCYTIC, NORMOCHROMIC METABOLIC PANEL, COMPREHENSIVE    Collection Time: 04/08/22  4:07 PM   Result Value Ref Range    Sodium 136 136 - 145 mmol/L    Potassium 4.9 3.5 - 5.1 mmol/L    Chloride 107 97 - 108 mmol/L    CO2 23 21 - 32 mmol/L    Anion gap 6 5 - 15 mmol/L    Glucose 237 (H) 65 - 100 mg/dL    BUN 20 6 - 20 MG/DL    Creatinine 1.15 0.70 - 1.30 MG/DL    BUN/Creatinine ratio 17 12 - 20      GFR est AA >60 >60 ml/min/1.73m2    GFR est non-AA >60 >60 ml/min/1.73m2    Calcium 10.0 8.5 - 10.1 MG/DL    Bilirubin, total 0.6 0.2 - 1.0 MG/DL    ALT (SGPT) 48 12 - 78 U/L    AST (SGOT) 23 15 - 37 U/L    Alk. phosphatase 157 (H) 45 - 117 U/L    Protein, total 8.8 (H) 6.4 - 8.2 g/dL    Albumin 4.7 3.5 - 5.0 g/dL    Globulin 4.1 (H) 2.0 - 4.0 g/dL    A-G Ratio 1.1 1.1 - 2.2     LIPASE    Collection Time: 04/08/22  4:07 PM   Result Value Ref Range    Lipase 133 73 - 393 U/L   URINALYSIS W/MICROSCOPIC    Collection Time: 04/08/22  5:47 PM   Result Value Ref Range    Color YELLOW/STRAW      Appearance CLEAR CLEAR      Specific gravity 1.010 1.003 - 1.030      pH (UA) 5.0 5.0 - 8.0      Protein Negative NEG mg/dL    Glucose >1,000 (A) NEG mg/dL    Ketone TRACE (A) NEG mg/dL    Bilirubin Negative NEG      Blood Negative NEG      Urobilinogen 0.2 0.2 - 1.0 EU/dL    Nitrites Negative NEG      Leukocyte Esterase Negative NEG      WBC 0-4 0 - 4 /hpf    RBC 0-5 0 - 5 /hpf    Epithelial cells FEW FEW /lpf    Bacteria Negative NEG /hpf    Hyaline cast 0-2 0 - 5 /lpf   URINE CULTURE HOLD SAMPLE    Collection Time: 04/08/22  5:47 PM    Specimen: Serum; Urine   Result Value Ref Range    Urine culture hold        Urine on hold in Microbiology dept for 2 days. If unpreserved urine is submitted, it cannot be used for addtional testing after 24 hours, recollection will be required.    GLUCOSE, POC    Collection Time: 04/08/22  8:29 PM   Result Value Ref Range    Glucose (POC) 244 (H) 65 - 117 mg/dL    Performed by DESIREE ATKINS        IMAGING RESULTS:  CT ABD PELV W CONT   Final Result   1. No acute abnormality in the abdomen or pelvis. 2. Stable right infrahilar and subcentimeter right lower lobe lung nodules. MEDICATIONS GIVEN:  Medications   sodium chloride 0.9 % bolus infusion 1,000 mL (0 mL IntraVENous IV Completed 4/8/22 1947)   iopamidoL (ISOVUE-370) 76 % injection 100 mL (100 mL IntraVENous Given 4/8/22 1708)   ondansetron (ZOFRAN) injection 4 mg (4 mg IntraVENous Given 4/8/22 1845)   famotidine (PF) (PEPCID) 20 mg in sodium chloride (NS) 10 mL injection (20 mg IntraVENous Given 4/8/22 1939)   diphenhydrAMINE (BENADRYL) injection 25 mg (25 mg IntraVENous Given 4/8/22 1938)   prochlorperazine (COMPAZINE) with saline injection 5 mg (5 mg IntraVENous Given 4/8/22 1938)       IMPRESSION:  1. Right-sided abdominal pain of unknown cause    2. Leukocytosis, unspecified type    3. Nausea and vomiting, unspecified vomiting type    4. Lung nodule    5. Hyperglycemia        PLAN:  1. Discharge Medication List as of 4/8/2022  8:34 PM        2. Follow-up Information     Follow up With Specialties Details Why Contact Info    Any [facility] Physician Assistant Schedule an appointment as soon as possible for a visit   [address]  [address] [address] [facility] [address]      [address] [facility] Emergency Medicine  If symptoms worsen [address]  [phone]        3.  Return to ED if worse

## 2022-04-08 NOTE — Clinical Note
[address]  [address] [facility] [address]  [phone]    Work/School Note    Date: 4/8/2022    To Whom It May concern:    Grafton Alpers was seen and treated today in the emergency room by the following provider(s):  Attending Provider: Mejia Redding MD  Physician Assistant: Sabino Howard NP. Grafton Alpers is excused from work/school on 4/8/2022 through 4/10/2022. He is medically clear to return to work/school on 4/11/2022.          Sincerely,          Asha Rios NP

## 2022-04-08 NOTE — Clinical Note
[address]  [address] [facility] [address]  [phone]    Work/School Note    Date: 4/8/2022    To Whom It May concern:    Indira Cramer was seen and treated today in the emergency room by the following provider(s):  Attending Provider: Hans Barajas MD  Physician Assistant: Hunter Huff NP. Indira Cramer is excused from work/school on 4/8/2022 through 4/10/2022. He is medically clear to return to work/school on 4/11/2022.          Sincerely,          Tucker Gillespie, NP

## 2022-04-08 NOTE — DISCHARGE INSTRUCTIONS
Take medications as directed, you have been prescribed a medication to help manage her nausea. As of right now, I would encourage you to only take in clear liquids. Your CT scan does not have explanation for your abdominal pain or nausea and vomiting, this could be a virus, as we are seeing many patients with similar presentation. Additionally once you feel your symptoms start to improve, you may utilize a bland diet. Your blood sugar was elevated today, please follow-up with your primary care provider as soon as possible for management of your diabetes. It is very common to find incidental findings on your CT scan, there is a small and stable lung nodule present on your CT scan. This does not mean that you have lung cancer, but is something that should be monitored by your primary care provider. Please return to the emergency department should you have any worsening or worrisome symptoms.

## 2022-04-24 ENCOUNTER — TRANSCRIBE ORDER (OUTPATIENT)
Dept: SCHEDULING | Age: 37
End: 2022-04-24

## 2022-04-24 DIAGNOSIS — R10.11 RIGHT UPPER QUADRANT PAIN: Primary | ICD-10-CM

## 2022-04-29 ENCOUNTER — HOSPITAL ENCOUNTER (OUTPATIENT)
Dept: NUCLEAR MEDICINE | Age: 37
Discharge: HOME | End: 2022-04-29
Attending: PHYSICIAN ASSISTANT
Payer: COMMERCIAL

## 2022-04-29 DIAGNOSIS — R10.11 RIGHT UPPER QUADRANT PAIN: ICD-10-CM

## 2022-04-29 PROCEDURE — 78227 HEPATOBIL SYST IMAGE W/DRUG: CPT

## 2022-04-29 RX ORDER — KIT FOR THE PREPARATION OF TECHNETIUM TC 99M MEBROFENIN 45 MG/10ML
6 INJECTION, POWDER, LYOPHILIZED, FOR SOLUTION INTRAVENOUS
Status: COMPLETED | OUTPATIENT
Start: 2022-04-29 | End: 2022-04-29

## 2022-04-29 RX ADMIN — KIT FOR THE PREPARATION OF TECHNETIUM TC 99M MEBROFENIN 6 MILLICURIE: 45 INJECTION, POWDER, LYOPHILIZED, FOR SOLUTION INTRAVENOUS at 11:10
